# Patient Record
Sex: MALE | Race: WHITE | NOT HISPANIC OR LATINO | Employment: FULL TIME | URBAN - METROPOLITAN AREA
[De-identification: names, ages, dates, MRNs, and addresses within clinical notes are randomized per-mention and may not be internally consistent; named-entity substitution may affect disease eponyms.]

---

## 2023-01-20 PROBLEM — R05.9 COUGH: Status: ACTIVE | Noted: 2023-01-20

## 2023-01-20 PROBLEM — N52.9 ERECTILE DYSFUNCTION: Status: ACTIVE | Noted: 2023-01-20

## 2023-01-20 PROBLEM — E78.00 HYPERCHOLESTEROLEMIA: Status: ACTIVE | Noted: 2023-01-20

## 2023-01-20 PROBLEM — R73.01 ELEVATED FASTING GLUCOSE: Status: ACTIVE | Noted: 2023-01-20

## 2023-01-20 PROBLEM — E78.5 HYPERLIPIDEMIA: Status: ACTIVE | Noted: 2023-01-20

## 2023-01-20 PROBLEM — R68.82 DECREASED LIBIDO: Status: ACTIVE | Noted: 2023-01-20

## 2023-01-20 PROBLEM — E55.9 VITAMIN D DEFICIENCY: Status: ACTIVE | Noted: 2023-01-20

## 2023-01-20 PROBLEM — L23.7 CONTACT DERMATITIS DUE TO POISON IVY: Status: ACTIVE | Noted: 2023-01-20

## 2023-01-20 PROBLEM — J40 BRONCHITIS: Status: ACTIVE | Noted: 2023-01-20

## 2023-01-20 PROBLEM — G47.33 OBSTRUCTIVE SLEEP APNEA: Status: ACTIVE | Noted: 2023-01-20

## 2023-01-20 PROBLEM — R03.0 ELEVATED BLOOD PRESSURE READING WITHOUT DIAGNOSIS OF HYPERTENSION: Status: ACTIVE | Noted: 2023-01-20

## 2023-01-20 PROBLEM — R74.01 ELEVATED ALT MEASUREMENT: Status: ACTIVE | Noted: 2023-01-20

## 2023-01-20 PROBLEM — R73.03 PREDIABETES: Status: ACTIVE | Noted: 2023-01-20

## 2023-01-20 RX ORDER — SILDENAFIL 100 MG/1
100 TABLET, FILM COATED ORAL
COMMUNITY
End: 2023-12-06 | Stop reason: SDUPTHER

## 2023-03-07 ENCOUNTER — APPOINTMENT (OUTPATIENT)
Dept: PRIMARY CARE | Facility: CLINIC | Age: 48
End: 2023-03-07
Payer: COMMERCIAL

## 2023-10-26 ENCOUNTER — ANCILLARY PROCEDURE (OUTPATIENT)
Dept: RADIOLOGY | Facility: CLINIC | Age: 48
End: 2023-10-26
Payer: COMMERCIAL

## 2023-10-26 ENCOUNTER — OFFICE VISIT (OUTPATIENT)
Dept: PRIMARY CARE | Facility: CLINIC | Age: 48
End: 2023-10-26
Payer: COMMERCIAL

## 2023-10-26 VITALS
HEIGHT: 75 IN | DIASTOLIC BLOOD PRESSURE: 71 MMHG | OXYGEN SATURATION: 96 % | HEART RATE: 62 BPM | BODY MASS INDEX: 38.27 KG/M2 | WEIGHT: 307.8 LBS | SYSTOLIC BLOOD PRESSURE: 128 MMHG

## 2023-10-26 DIAGNOSIS — M54.2 NECK PAIN: ICD-10-CM

## 2023-10-26 DIAGNOSIS — Z23 FLU VACCINE NEED: ICD-10-CM

## 2023-10-26 DIAGNOSIS — M54.2 NECK PAIN: Primary | ICD-10-CM

## 2023-10-26 DIAGNOSIS — S06.0X1D CONCUSSION WITH LOSS OF CONSCIOUSNESS OF 30 MINUTES OR LESS, SUBSEQUENT ENCOUNTER: ICD-10-CM
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
## 2023-10-26 PROBLEM — S06.0X1A CONCUSSION WITH LOSS OF CONSCIOUSNESS OF 30 MINUTES OR LESS: Status: ACTIVE | Noted: 2023-10-26
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
## 2023-10-26 PROCEDURE — 1036F TOBACCO NON-USER: CPT | Performed by: INTERNAL MEDICINE

## 2023-10-26 PROCEDURE — 90471 IMMUNIZATION ADMIN: CPT | Performed by: INTERNAL MEDICINE

## 2023-10-26 PROCEDURE — 72052 X-RAY EXAM NECK SPINE 6/>VWS: CPT | Performed by: RADIOLOGY

## 2023-10-26 PROCEDURE — 72052 X-RAY EXAM NECK SPINE 6/>VWS: CPT | Mod: FY

## 2023-10-26 PROCEDURE — 90686 IIV4 VACC NO PRSV 0.5 ML IM: CPT | Performed by: INTERNAL MEDICINE

## 2023-10-26 PROCEDURE — 99213 OFFICE O/P EST LOW 20 MIN: CPT | Performed by: INTERNAL MEDICINE

## 2023-10-26 ASSESSMENT — ENCOUNTER SYMPTOMS
FEVER: 0
HEADACHES: 1
LIGHT-HEADEDNESS: 1
COUGH: 0
WEAKNESS: 0
MYALGIAS: 0
SHORTNESS OF BREATH: 0
ARTHRALGIAS: 0
DIZZINESS: 0
HEMATURIA: 0
SORE THROAT: 0
FATIGUE: 1
DIFFICULTY URINATING: 0
CHILLS: 0
FREQUENCY: 0
PALPITATIONS: 0
VOMITING: 0
NAUSEA: 0
DYSURIA: 0
CONSTIPATION: 0
DIARRHEA: 0

## 2023-10-26 ASSESSMENT — PATIENT HEALTH QUESTIONNAIRE - PHQ9
2. FEELING DOWN, DEPRESSED OR HOPELESS: NOT AT ALL
1. LITTLE INTEREST OR PLEASURE IN DOING THINGS: NOT AT ALL
SUM OF ALL RESPONSES TO PHQ9 QUESTIONS 1 AND 2: 0

## 2023-10-26 ASSESSMENT — PAIN SCALES - GENERAL: PAINLEVEL: 6

## 2023-10-26 NOTE — ASSESSMENT & PLAN NOTE
Patient had a slip and fall on a basketball floor where he actually hit the back of his head and had a concussion and was unconscious for several minutes.  He did was taken to the emergency room where they did a CT of the head EKG however none of this is available has not been placed in the medical records yet from [facility].  Since then he has had a sore neck especially today headache still in the front of his head he has sound light sensitive and a little dizzy on and off.  He clearly has a concussion so we did recommend he avoid anything that would stimulate or suppress the brain send no caffeine, no alcohol no exertion or exercise.  He is to rest and avoid television video games or reading.  I have told him that I want him to rest for the rest of the weekend and not to work today tomorrow or the weekend but if he is feeling better can go back to work on Monday.  He is not to work on exercise which she does vigorously until at least Tuesday or Wednesday of next week.  When he starts to workout again he was told to start slowly and build up but if it anytime his headaches return he is to back off again.  Patient states understanding.  Wife was also present.  Since I do not have any studies from the ER I will check an x-ray of the neck to make sure he has nothing like a fracture but I suspect this is more like whiplash from the fall.  I did recommend massage to the neck IcyHot and hot compresses.

## 2023-10-26 NOTE — PROGRESS NOTES
Subjective   Patient ID: Panchito Kimble is a 48 y.o. male who presents for ER follow up for neck pain.  BN    Patient is here for ER follow-up after a fall loss of consciousness and concussion.  On Tuesday the patient was in the gym walking across the floor when he suddenly slipped fell and became unconscious.  He was unconscious for several minutes at least 2-3 according to witnesses.  When he woke up he was snoring and gasping for air but he was on his back and does have known sleep apnea.  He remembers waking up to the  waking him up on the gym floor everything is kind of foggy as he was taken by ambulance to [facility] where he did have a CT of the head and EKG was told everything was okay and was discharged with the diagnosis of a fall and concussion.  He is here for follow-up since then.  He does note that his neck is very sore especially if he moves it and he saw the headache in the front of his head since Tuesday.  He is slightly light sensitive, sound sensitive and a little dizzy on and off.  His wife drove him here today and he said that he thought he was feeling fine after resting yesterday until the drive today and now he has headache is definitely back.  He denies any nausea or vomiting.          Review of Systems   Constitutional:  Positive for fatigue. Negative for chills and fever.   HENT:  Negative for sore throat.    Eyes:  Negative for visual disturbance.   Respiratory:  Negative for cough and shortness of breath.    Cardiovascular:  Negative for chest pain, palpitations and leg swelling.   Gastrointestinal:  Negative for constipation, diarrhea, nausea and vomiting.   Genitourinary:  Negative for difficulty urinating, dysuria, frequency, hematuria and urgency.   Musculoskeletal:  Negative for arthralgias and myalgias.   Skin:  Negative for rash.   Neurological:  Positive for light-headedness and headaches. Negative for dizziness, syncope and weakness.       Objective  "  Medication Documentation Review Audit       Reviewed by Laurence Long MD (Physician) on 10/26/23 at 1005      Medication Order Taking? Sig Documenting Provider Last Dose Status   sildenafil (Viagra) 100 mg tablet [id number]  Take 1 tablet (100 mg) by mouth. Take 1 tablet orally at least 60 min before intercourse Historical Provider, MD  Active                  No Known Allergies  Physical Exam  Constitutional:       Appearance: Normal appearance.   HENT:      Head: Normocephalic and atraumatic.      Nose: Nose normal.   Eyes:      Extraocular Movements: Extraocular movements intact.      Pupils: Pupils are equal, round, and reactive to light.   Cardiovascular:      Rate and Rhythm: Normal rate and regular rhythm.   Pulmonary:      Breath sounds: Normal breath sounds.   Abdominal:      General: Abdomen is flat. Bowel sounds are normal.      Palpations: Abdomen is soft.   Musculoskeletal:      Right lower leg: No edema.      Left lower leg: No edema.   Neurological:      Mental Status: He is alert.     /71   Pulse 62   Ht 1.892 m (6' 2.5\")   Wt 140 kg (307 lb 12.8 oz)   SpO2 96%   BMI 38.99 kg/m²       Assessment/Plan   Problem List Items Addressed This Visit       Fall    Concussion with loss of consciousness of 30 minutes or less     Patient had a slip and fall on a basketball floor where he actually hit the back of his head and had a concussion and was unconscious for several minutes.  He did was taken to the emergency room where they did a CT of the head EKG however none of this is available has not been placed in the medical records yet from [facility].  Since then he has had a sore neck especially today headache still in the front of his head he has sound light sensitive and a little dizzy on and off.  He clearly has a concussion so we did recommend he avoid anything that would stimulate or suppress the brain send no caffeine, no alcohol no exertion or exercise.  He is to rest and " avoid television video games or reading.  I have told him that I want him to rest for the rest of the weekend and not to work today tomorrow or the weekend but if he is feeling better can go back to work on Monday.  He is not to work on exercise which she does vigorously until at least Tuesday or Wednesday of next week.  When he starts to workout again he was told to start slowly and build up but if it anytime his headaches return he is to back off again.  Patient states understanding.  Wife was also present.  Since I do not have any studies from the ER I will check an x-ray of the neck to make sure he has nothing like a fracture but I suspect this is more like whiplash from the fall.  I did recommend massage to the neck IcyHot and hot compresses.              Other Visit Diagnoses       Neck pain    -  Primary    Flu vaccine need        Relevant Orders    Flu vaccine (IIV4) age 6 months and greater, preservative free (Completed)                   It has been a pleasure seeing you.  Laurence Long MD

## 2023-10-30 ENCOUNTER — TELEPHONE (OUTPATIENT)
Dept: PRIMARY CARE | Facility: CLINIC | Age: 48
End: 2023-10-30
Payer: COMMERCIAL

## 2023-10-30 NOTE — TELEPHONE ENCOUNTER
----- Message from Laurence Long MD sent at 10/30/2023  9:12 AM EDT -----  Please notify patient his x-rays show mild to moderate arthritic changes in the neck but no fractures or trauma from the fall.

## 2023-12-06 DIAGNOSIS — N52.9 ERECTILE DYSFUNCTION, UNSPECIFIED ERECTILE DYSFUNCTION TYPE: Primary | ICD-10-CM

## 2023-12-06 RX ORDER — SILDENAFIL 100 MG/1
100 TABLET, FILM COATED ORAL DAILY PRN
Qty: 10 TABLET | Refills: 11 | Status: SHIPPED | OUTPATIENT
Start: 2023-12-06 | End: 2024-01-08 | Stop reason: SDUPTHER

## 2024-01-08 DIAGNOSIS — N52.9 ERECTILE DYSFUNCTION, UNSPECIFIED ERECTILE DYSFUNCTION TYPE: ICD-10-CM

## 2024-01-08 DIAGNOSIS — Z00.00 ANNUAL PHYSICAL EXAM: Primary | ICD-10-CM

## 2024-01-08 RX ORDER — SILDENAFIL 100 MG/1
100 TABLET, FILM COATED ORAL DAILY PRN
Qty: 10 TABLET | Refills: 0 | Status: SHIPPED | OUTPATIENT
Start: 2024-01-08 | End: 2024-03-08 | Stop reason: SDUPTHER

## 2024-03-07 ENCOUNTER — LAB (OUTPATIENT)
Dept: LAB | Facility: LAB | Age: 49
End: 2024-03-07
Payer: COMMERCIAL

## 2024-03-07 DIAGNOSIS — Z00.00 ANNUAL PHYSICAL EXAM: ICD-10-CM

## 2024-03-07 LAB
25(OH)D3 SERPL-MCNC: 34 NG/ML (ref 30–100)
ALBUMIN SERPL BCP-MCNC: 4.7 G/DL (ref 3.4–5)
ALP SERPL-CCNC: 62 U/L (ref 33–120)
ALT SERPL W P-5'-P-CCNC: 35 U/L (ref 10–52)
ANION GAP SERPL CALC-SCNC: 13 MMOL/L (ref 10–20)
AST SERPL W P-5'-P-CCNC: 21 U/L (ref 9–39)
BILIRUB SERPL-MCNC: 0.7 MG/DL (ref 0–1.2)
BUN SERPL-MCNC: 15 MG/DL (ref 6–23)
CALCIUM SERPL-MCNC: 9.8 MG/DL (ref 8.6–10.6)
CHLORIDE SERPL-SCNC: 100 MMOL/L (ref 98–107)
CHOLEST SERPL-MCNC: 214 MG/DL (ref 0–199)
CHOLESTEROL/HDL RATIO: 5.9
CO2 SERPL-SCNC: 31 MMOL/L (ref 21–32)
CREAT SERPL-MCNC: 1.01 MG/DL (ref 0.5–1.3)
EGFRCR SERPLBLD CKD-EPI 2021: >90 ML/MIN/1.73M*2
ERYTHROCYTE [DISTWIDTH] IN BLOOD BY AUTOMATED COUNT: 12.3 % (ref 11.5–14.5)
GLUCOSE SERPL-MCNC: 99 MG/DL (ref 74–99)
HCT VFR BLD AUTO: 51.1 % (ref 41–52)
HDLC SERPL-MCNC: 36.3 MG/DL
HGB BLD-MCNC: 17.5 G/DL (ref 13.5–17.5)
LDLC SERPL CALC-MCNC: 141 MG/DL
MCH RBC QN AUTO: 30.1 PG (ref 26–34)
MCHC RBC AUTO-ENTMCNC: 34.2 G/DL (ref 32–36)
MCV RBC AUTO: 88 FL (ref 80–100)
NON HDL CHOLESTEROL: 178 MG/DL (ref 0–149)
NRBC BLD-RTO: 0 /100 WBCS (ref 0–0)
PLATELET # BLD AUTO: 268 X10*3/UL (ref 150–450)
POTASSIUM SERPL-SCNC: 4.1 MMOL/L (ref 3.5–5.3)
PROT SERPL-MCNC: 7.3 G/DL (ref 6.4–8.2)
RBC # BLD AUTO: 5.82 X10*6/UL (ref 4.5–5.9)
SODIUM SERPL-SCNC: 140 MMOL/L (ref 136–145)
TRIGL SERPL-MCNC: 185 MG/DL (ref 0–149)
TSH SERPL-ACNC: 1 MIU/L (ref 0.44–3.98)
VLDL: 37 MG/DL (ref 0–40)
WBC # BLD AUTO: 7.1 X10*3/UL (ref 4.4–11.3)

## 2024-03-07 PROCEDURE — 84443 ASSAY THYROID STIM HORMONE: CPT

## 2024-03-07 PROCEDURE — 36415 COLL VENOUS BLD VENIPUNCTURE: CPT

## 2024-03-07 PROCEDURE — 82306 VITAMIN D 25 HYDROXY: CPT

## 2024-03-07 PROCEDURE — 80053 COMPREHEN METABOLIC PANEL: CPT

## 2024-03-07 PROCEDURE — 85027 COMPLETE CBC AUTOMATED: CPT

## 2024-03-07 PROCEDURE — 80061 LIPID PANEL: CPT

## 2024-03-08 DIAGNOSIS — N52.9 ERECTILE DYSFUNCTION, UNSPECIFIED ERECTILE DYSFUNCTION TYPE: ICD-10-CM

## 2024-03-08 RX ORDER — SILDENAFIL 100 MG/1
100 TABLET, FILM COATED ORAL DAILY PRN
Qty: 10 TABLET | Refills: 11 | Status: SHIPPED | OUTPATIENT
Start: 2024-03-08

## 2024-07-11 DIAGNOSIS — N52.9 ERECTILE DYSFUNCTION, UNSPECIFIED ERECTILE DYSFUNCTION TYPE: ICD-10-CM

## 2024-07-11 NOTE — TELEPHONE ENCOUNTER
Patient has a physical 7/17    He wants to know if he can/should do b/w before then?    Also, there is a rx you wrote him awhile ago (not viagra) something with a C, he cannot remember,but he would like a refill if possible

## 2024-07-12 RX ORDER — SILDENAFIL 100 MG/1
100 TABLET, FILM COATED ORAL DAILY PRN
Qty: 10 TABLET | Refills: 0 | Status: SHIPPED | OUTPATIENT
Start: 2024-07-12 | End: 2024-07-17 | Stop reason: SDUPTHER

## 2024-07-12 NOTE — TELEPHONE ENCOUNTER
Patient to know if he need blood work before his next appointment    Med refill    sildenafil (Viagra) 100 mg tablet  100 mg, Daily PRN           Summary: Take 1 tablet (100 mg) by mouth   once daily as needed for erectile dysfunction.   Take 1 tablet orally at least 60 min before   intercourse,        [place]

## 2024-07-12 NOTE — TELEPHONE ENCOUNTER
He does have a physical scheduled for Wednesday the 17th    Still asking if he needs b/w before    Also says please call in whatever it was before  (Viagra)  He would like to  today    Sildenafil 100 mg tablet   Take 1 tablet (100mg) by mouth once daily as needed for erectile dysfunction. Take 1 tablet orally at least 60 min before intercourse    [facility] [phone]

## 2024-07-17 ENCOUNTER — APPOINTMENT (OUTPATIENT)
Dept: PRIMARY CARE | Facility: CLINIC | Age: 49
End: 2024-07-17
Payer: COMMERCIAL

## 2024-07-17 VITALS
SYSTOLIC BLOOD PRESSURE: 126 MMHG | DIASTOLIC BLOOD PRESSURE: 76 MMHG | HEART RATE: 61 BPM | OXYGEN SATURATION: 95 % | HEIGHT: 74 IN | BODY MASS INDEX: 39.35 KG/M2 | WEIGHT: 306.6 LBS

## 2024-07-17 DIAGNOSIS — Z12.11 COLON CANCER SCREENING: Primary | ICD-10-CM

## 2024-07-17 DIAGNOSIS — E78.00 HYPERCHOLESTEROLEMIA: ICD-10-CM

## 2024-07-17 DIAGNOSIS — Z00.00 ANNUAL PHYSICAL EXAM: ICD-10-CM

## 2024-07-17 DIAGNOSIS — N52.9 ERECTILE DYSFUNCTION, UNSPECIFIED ERECTILE DYSFUNCTION TYPE: ICD-10-CM

## 2024-07-17 DIAGNOSIS — L23.7 CONTACT DERMATITIS DUE TO POISON IVY: ICD-10-CM

## 2024-07-17 DIAGNOSIS — R03.0 ELEVATED BLOOD PRESSURE READING WITHOUT DIAGNOSIS OF HYPERTENSION: ICD-10-CM

## 2024-07-17 PROBLEM — S06.0X1A CONCUSSION WITH LOSS OF CONSCIOUSNESS OF 30 MINUTES OR LESS: Status: RESOLVED | Noted: 2023-10-26 | Resolved: 2024-07-17

## 2024-07-17 PROBLEM — R05.9 COUGH: Status: RESOLVED | Noted: 2023-01-20 | Resolved: 2024-07-17

## 2024-07-17 PROBLEM — J40 BRONCHITIS: Status: RESOLVED | Noted: 2023-01-20 | Resolved: 2024-07-17

## 2024-07-17 PROCEDURE — 1036F TOBACCO NON-USER: CPT | Performed by: INTERNAL MEDICINE

## 2024-07-17 PROCEDURE — 99396 PREV VISIT EST AGE 40-64: CPT | Performed by: INTERNAL MEDICINE

## 2024-07-17 PROCEDURE — 3008F BODY MASS INDEX DOCD: CPT | Performed by: INTERNAL MEDICINE

## 2024-07-17 RX ORDER — SILDENAFIL 100 MG/1
100 TABLET, FILM COATED ORAL DAILY PRN
Qty: 30 TABLET | Refills: 3 | Status: SHIPPED | OUTPATIENT
Start: 2024-07-17

## 2024-07-17 ASSESSMENT — ENCOUNTER SYMPTOMS
DIZZINESS: 0
TROUBLE SWALLOWING: 0
COUGH: 0
CONSTIPATION: 0
NAUSEA: 0
FEVER: 0
DIARRHEA: 0
PALPITATIONS: 0
ARTHRALGIAS: 0
BACK PAIN: 1
SHORTNESS OF BREATH: 0
VOMITING: 0
FATIGUE: 0
DYSURIA: 0
FREQUENCY: 0
SORE THROAT: 0
ABDOMINAL PAIN: 0
LIGHT-HEADEDNESS: 0

## 2024-07-17 ASSESSMENT — PATIENT HEALTH QUESTIONNAIRE - PHQ9
SUM OF ALL RESPONSES TO PHQ9 QUESTIONS 1 AND 2: 0
1. LITTLE INTEREST OR PLEASURE IN DOING THINGS: NOT AT ALL
2. FEELING DOWN, DEPRESSED OR HOPELESS: NOT AT ALL

## 2024-07-17 ASSESSMENT — PAIN SCALES - GENERAL: PAINLEVEL: 8

## 2024-07-17 NOTE — PROGRESS NOTES
Subjective   Patient ID: Panchito Kimble is a 49 y.o. male who presents for an annual physical visit.    Patient is here for annual physical exam.  He has a lipoma in the right lower abdominal wall he would like me to look at also he has noticed some increasing problems with lower back pain.  He cannot play to give the pickleball in a row without sitting when out and letting it stretch a little between.  Patient is very strong muscular and I suspect not as limber and recommended some stretching exercises.  I also recommended he consider going to the stretch lab for his lower back as the pain is located across the lumbar spine just above the pelvis bilaterally.  He occasionally gets pain into the left gluteal area and may begin experiencing some sciatica as well but denies any pain into the leg.        Review of Systems   Constitutional:  Negative for fatigue and fever.   HENT:  Negative for sore throat and trouble swallowing.    Eyes:  Negative for visual disturbance.   Respiratory:  Negative for cough and shortness of breath.    Cardiovascular:  Negative for chest pain, palpitations and leg swelling.   Gastrointestinal:  Negative for abdominal pain, constipation, diarrhea, nausea and vomiting.   Genitourinary:  Negative for dysuria and frequency.        Patient experiences some erectile dysfunction both getting and maintaining erection however tadalafil is working well for him and he does request a refill   Musculoskeletal:  Positive for back pain. Negative for arthralgias.   Skin:  Negative for rash.   Neurological:  Negative for dizziness and light-headedness.       Objective   Medication Documentation Review Audit       Reviewed by Laurence Long MD (Physician) on 07/17/24 at 1111      Medication Order Taking? Sig Documenting Provider Last Dose Status     Discontinued 07/12/24 1211   sildenafil (Viagra) 100 mg tablet [id number]  Take 1 tablet (100 mg) by mouth once daily as needed for erectile dysfunction. Take 1  "tablet orally at least 60 min before intercourse Laurence Long MD  Active                  No Known Allergies    /76   Pulse 61   Ht 1.88 m (6' 2\")   Wt 139 kg (306 lb 9.6 oz)   SpO2 95%   BMI 39.37 kg/m²     Physical Exam  Constitutional:       Appearance: Normal appearance.   HENT:      Head: Normocephalic and atraumatic.      Nose: Nose normal.   Eyes:      Extraocular Movements: Extraocular movements intact.      Pupils: Pupils are equal, round, and reactive to light.   Cardiovascular:      Rate and Rhythm: Normal rate and regular rhythm.   Pulmonary:      Breath sounds: Normal breath sounds.   Abdominal:      General: Abdomen is flat. Bowel sounds are normal.      Palpations: Abdomen is soft.      Comments: In the right lower quadrant of the abdomen just below the umbilical line patient has approximately 2 x 2 centimeter subcutaneous mass which is soft rounded and it presumably a lipoma.  Patient notes it has been present for at least 2 years and has not grown or changed to his knowledge.   Genitourinary:     Penis: Normal.       Testes: Normal.      Comments: External genitalia grossly normal.  Penis is grossly normal.  Testicles have no dominant masses.  There were no inguinal hernias bilaterally.  Musculoskeletal:      Right lower leg: No edema.      Left lower leg: No edema.   Neurological:      Mental Status: He is alert.           Assessment/Plan   Problem List Items Addressed This Visit       Contact dermatitis due to poison ivy     Patient has 2 small patches on his right forearm each 1 only 1 cm wide at the width that is the biggest and between 2 and 3 cm long.  Both areas are already healing over and have no open vesicles or sores.  I recommended he use IV dry over-the-counter if that is not sufficient call us and we will consider a superhigh potency steroid gel.  If he continues to worsen we go issues oral steroids if necessary         Elevated blood pressure reading without diagnosis of " hypertension    Erectile dysfunction     Patient uses tadalafil successfully and says that it is working well for him.  He was given a refill for the year         Relevant Medications    sildenafil (Viagra) 100 mg tablet    Hypercholesterolemia     Patient has mildly elevated cholesterol he will continue to work on diet and exercise.  He has been having a hard time losing weight so we talked about how to decrease both carbohydrates and fats by reducing cheese nuts high-fat foods like pepperoni salami and work on low-carb diets as well.  If he continues to have issues he is to call me back.         Annual physical exam     Annual physical exam completed today and to date including inguinal exam and testicular exam.  He has no symptoms so we did not do a prostate exam    Annual blood work was finished in March.  Patient is overdue for colonoscopy and we will help arrange 1 now.    Patient has some low back pain which is I believe muscular in origin and I recommended he consider going to stretch lab or learning how to stretch his back properly.  He was also or offered physical therapy which she declines now but if symptoms persist he will call back and we can give him a referral          Other Visit Diagnoses       Colon cancer screening    -  Primary    Relevant Orders    Colonoscopy Screening; High Risk Patient                   It has been a pleasure seeing you.  Laurence Long MD

## 2024-07-17 NOTE — ASSESSMENT & PLAN NOTE
Annual physical exam completed today and to date including inguinal exam and testicular exam.  He has no symptoms so we did not do a prostate exam    Annual blood work was finished in March.  Patient is overdue for colonoscopy and we will help arrange 1 now.    Patient has some low back pain which is I believe muscular in origin and I recommended he consider going to stretch lab or learning how to stretch his back properly.  He was also or offered physical therapy which she declines now but if symptoms persist he will call back and we can give him a referral

## 2024-07-17 NOTE — ASSESSMENT & PLAN NOTE
Patient has 2 small patches on his right forearm each 1 only 1 cm wide at the width that is the biggest and between 2 and 3 cm long.  Both areas are already healing over and have no open vesicles or sores.  I recommended he use IV dry over-the-counter if that is not sufficient call us and we will consider a superhigh potency steroid gel.  If he continues to worsen we go issues oral steroids if necessary

## 2024-07-17 NOTE — ASSESSMENT & PLAN NOTE
Patient has mildly elevated cholesterol he will continue to work on diet and exercise.  He has been having a hard time losing weight so we talked about how to decrease both carbohydrates and fats by reducing cheese nuts high-fat foods like pepperoni salami and work on low-carb diets as well.  If he continues to have issues he is to call me back.

## 2024-07-17 NOTE — PATIENT INSTRUCTIONS
Set up colonoscopy    Try Lucila Dry for poison lucila     Follow up Dr Long in 1 year for annual physical

## 2024-07-17 NOTE — ASSESSMENT & PLAN NOTE
Patient uses tadalafil successfully and says that it is working well for him.  He was given a refill for the year

## 2024-07-18 ENCOUNTER — TELEPHONE (OUTPATIENT)
Dept: PRIMARY CARE | Facility: CLINIC | Age: 49
End: 2024-07-18
Payer: COMMERCIAL

## 2024-07-18 NOTE — TELEPHONE ENCOUNTER
Patient left message on phone- decided he does want steroid cream for his poison ivy. [facility]. Can this be sent in for him.

## 2024-07-19 DIAGNOSIS — L24.9 IRRITANT CONTACT DERMATITIS, UNSPECIFIED TRIGGER: Primary | ICD-10-CM

## 2024-07-19 RX ORDER — BETAMETHASONE DIPROPIONATE 0.5 MG/G
GEL TOPICAL 2 TIMES DAILY
Qty: 15 G | Refills: 0 | Status: SHIPPED | OUTPATIENT
Start: 2024-07-19

## 2024-10-24 DIAGNOSIS — Z12.11 COLON CANCER SCREENING: ICD-10-CM

## 2024-10-24 RX ORDER — POLYETHYLENE GLYCOL 3350, SODIUM CHLORIDE, SODIUM BICARBONATE, POTASSIUM CHLORIDE 420; 11.2; 5.72; 1.48 G/4L; G/4L; G/4L; G/4L
4000 POWDER, FOR SOLUTION ORAL ONCE
Qty: 4000 ML | Refills: 0 | Status: SHIPPED | OUTPATIENT
Start: 2024-11-04 | End: 2024-11-04

## 2024-10-29 ENCOUNTER — ANESTHESIA EVENT (OUTPATIENT)
Dept: GASTROENTEROLOGY | Facility: EXTERNAL LOCATION | Age: 49
End: 2024-10-29

## 2024-10-30 ENCOUNTER — APPOINTMENT (OUTPATIENT)
Dept: OPERATING ROOM | Facility: CLINIC | Age: 49
End: 2024-10-30
Payer: COMMERCIAL

## 2024-11-11 ENCOUNTER — ANESTHESIA (OUTPATIENT)
Dept: GASTROENTEROLOGY | Facility: EXTERNAL LOCATION | Age: 49
End: 2024-11-11

## 2024-11-11 ENCOUNTER — APPOINTMENT (OUTPATIENT)
Dept: GASTROENTEROLOGY | Facility: EXTERNAL LOCATION | Age: 49
End: 2024-11-11
Payer: COMMERCIAL

## 2024-11-11 VITALS
HEART RATE: 61 BPM | DIASTOLIC BLOOD PRESSURE: 45 MMHG | OXYGEN SATURATION: 96 % | TEMPERATURE: 98.4 F | BODY MASS INDEX: 37.86 KG/M2 | HEIGHT: 74 IN | RESPIRATION RATE: 18 BRPM | SYSTOLIC BLOOD PRESSURE: 123 MMHG | WEIGHT: 295 LBS

## 2024-11-11 DIAGNOSIS — Z12.11 COLON CANCER SCREENING: ICD-10-CM

## 2024-11-11 PROCEDURE — 88305 TISSUE EXAM BY PATHOLOGIST: CPT | Mod: TC,ELYLAB | Performed by: INTERNAL MEDICINE

## 2024-11-11 PROCEDURE — 45380 COLONOSCOPY AND BIOPSY: CPT | Performed by: INTERNAL MEDICINE

## 2024-11-11 RX ORDER — ONDANSETRON HYDROCHLORIDE 2 MG/ML
4 INJECTION, SOLUTION INTRAVENOUS ONCE AS NEEDED
Status: DISCONTINUED | OUTPATIENT
Start: 2024-11-11 | End: 2024-11-12 | Stop reason: HOSPADM

## 2024-11-11 RX ORDER — LIDOCAINE HYDROCHLORIDE 20 MG/ML
INJECTION, SOLUTION INFILTRATION; PERINEURAL AS NEEDED
Status: DISCONTINUED | OUTPATIENT
Start: 2024-11-11 | End: 2024-11-11

## 2024-11-11 RX ORDER — PROPOFOL 10 MG/ML
INJECTION, EMULSION INTRAVENOUS AS NEEDED
Status: DISCONTINUED | OUTPATIENT
Start: 2024-11-11 | End: 2024-11-11

## 2024-11-11 SDOH — HEALTH STABILITY: MENTAL HEALTH: CURRENT SMOKER: 0

## 2024-11-11 ASSESSMENT — PAIN SCALES - GENERAL
PAIN_LEVEL: 0
PAINLEVEL_OUTOF10: 0 - NO PAIN

## 2024-11-11 ASSESSMENT — COLUMBIA-SUICIDE SEVERITY RATING SCALE - C-SSRS
2. HAVE YOU ACTUALLY HAD ANY THOUGHTS OF KILLING YOURSELF?: NO
6. HAVE YOU EVER DONE ANYTHING, STARTED TO DO ANYTHING, OR PREPARED TO DO ANYTHING TO END YOUR LIFE?: NO
1. IN THE PAST MONTH, HAVE YOU WISHED YOU WERE DEAD OR WISHED YOU COULD GO TO SLEEP AND NOT WAKE UP?: NO

## 2024-11-11 ASSESSMENT — PAIN - FUNCTIONAL ASSESSMENT
PAIN_FUNCTIONAL_ASSESSMENT: 0-10
PAIN_FUNCTIONAL_ASSESSMENT: 0-10

## 2024-11-11 NOTE — DISCHARGE INSTRUCTIONS

## 2024-11-11 NOTE — ANESTHESIA PREPROCEDURE EVALUATION
Patient: Panchito Kimble    Procedure Information       Date/Time: 11/11/24 1100    Scheduled providers: Dean Wong MD; María Welsh RN    Procedure: COLONOSCOPY    Location: [facility]            Relevant Problems   Anesthesia (within normal limits)      Cardiac   (+) Hypercholesterolemia   (+) Hyperlipidemia      Pulmonary   (+) Obstructive sleep apnea      Neuro (within normal limits)      GI (within normal limits)      /Renal (within normal limits)      Liver (within normal limits)      Endocrine (within normal limits)      Hematology (within normal limits)      Musculoskeletal (within normal limits)      HEENT (within normal limits)      ID (within normal limits)      Skin (within normal limits)      GYN (within normal limits)       Clinical information reviewed:   Tobacco  Allergies  Meds   Med Hx  Surg Hx   Fam Hx  Soc Hx        NPO Detail:  No data recorded     Physical Exam    Airway  Mallampati: II  TM distance: >3 FB  Neck ROM: full     Cardiovascular   Rhythm: regular  Rate: normal     Dental    Pulmonary   Breath sounds clear to auscultation     Abdominal   Abdomen: soft  Bowel sounds: normal           Anesthesia Plan    History of general anesthesia?: yes  History of complications of general anesthesia?: no    ASA 2     MAC     The patient is not a current smoker.  Patient was not previously instructed to abstain from smoking on day of procedure.  Education provided regarding risk of obstructive sleep apnea.  intravenous induction   Anesthetic plan and risks discussed with patient.    Plan discussed with CRNA.

## 2024-11-11 NOTE — H&P
Outpatient Hospital Procedure H&P    Patient Profile-Procedures  Initial Info  Patient Demographics  Name Panchito Kimble  Date of Birth 1975  MRN [id number]  Address   [address] [address] [id number] [facility] [id number]    Primary Phone Number [phone]  Secondary Phone Number    Laurence Teran    Procedure(s):  Colonoscopy  Primary contact name and number   No emergency contact information on file.    General Health  Weight   Vitals:    11/11/24 1056   Weight: 134 kg (295 lb)     BMI Body mass index is 37.88 kg/m².    Allergies  No Known Allergies    Past Medical History   Past Medical History:   Diagnosis Date    Concussion with loss of consciousness of 30 minutes or less 10/26/2023       Provider assessment  Diagnosis: Screening    Medication Reviewed - yes  Prior to Admission medications    Medication Sig Start Date End Date Taking? Authorizing Provider   polyethylene glycol-electrolytes (Nulytely Lemon-Lime) 420 gram solution Take 4,000 mL by mouth 1 time for 1 dose. Do not fill before November 4, 2024. 11/4/24 11/11/24 Yes Dean Wong MD   betamethasone, augmented, (Diprolene) 0.05 % gel Apply topically 2 times a day. 7/19/24   Laurence Long MD   sildenafil (Viagra) 100 mg tablet Take 1 tablet (100 mg) by mouth once daily as needed for erectile dysfunction. Take 1 tablet orally at least 60 min before intercourse 7/17/24   Laurence Long MD       Physical Exam  Vitals:    11/11/24 1056   BP: 150/85   Pulse: 70   Resp: 12   Temp: 36.7 °C (98.1 °F)   SpO2: 94%        General: A&Ox3, NAD.  CV: RRR. No murmur.  Resp: CTA bilaterally. No wheezing, rhonchi or rales.   Extrem: No edema.       Oropharyngeal Classification III (soft and hard palate and base of uvula visible)  ASA PS Classification 3  Sedation Plan Deep  Procedure Plan - pre-procedural (re)assesment completed by physician:  discharge/transfer patient when discharge criteria met    Dean Wong  MD  11/11/2024 11:12 AM

## 2024-11-11 NOTE — ANESTHESIA POSTPROCEDURE EVALUATION
Patient: Panchito Kimble    Procedure Summary       Date: 11/11/24 Room / Location: [facility]    Anesthesia Start: 1116 Anesthesia Stop: 1143    Procedure: COLONOSCOPY Diagnosis: Colon cancer screening    Scheduled Providers: Dean Wong MD; María Welsh RN Responsible Provider: LIANA Barker    Anesthesia Type: MAC ASA Status: 2            Anesthesia Type: MAC    Vitals Value Taken Time   /61 11/11/24 1141   Temp 36.9 °C (98.4 °F) 11/11/24 1141   Pulse 62 11/11/24 1141   Resp 15 11/11/24 1141   SpO2 96 % 11/11/24 1141       Anesthesia Post Evaluation    Patient location during evaluation: bedside  Patient participation: complete - patient participated  Level of consciousness: awake  Pain score: 0  Pain management: adequate  Airway patency: patent  Cardiovascular status: acceptable  Respiratory status: acceptable  Hydration status: acceptable  Postoperative Nausea and Vomiting: none        No notable events documented.

## 2024-11-20 LAB
LABORATORY COMMENT REPORT: NORMAL
PATH REPORT.FINAL DX SPEC: NORMAL
PATH REPORT.GROSS SPEC: NORMAL
PATH REPORT.TOTAL CANCER: NORMAL

## 2025-05-01 ENCOUNTER — TELEPHONE (OUTPATIENT)
Dept: PRIMARY CARE | Facility: CLINIC | Age: 50
End: 2025-05-01
Payer: COMMERCIAL

## 2025-05-01 NOTE — TELEPHONE ENCOUNTER
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   He wants to know if Dr Long can do this or does he go to a dermatologist?  He thinks his old PCP did this years ago but he cannot remember for sure.   Should I make him an appointment or does he need a derm referral?    Panchito  [phone]

## 2025-05-06 ENCOUNTER — APPOINTMENT (OUTPATIENT)
Dept: PRIMARY CARE | Facility: CLINIC | Age: 50
End: 2025-05-06
Payer: COMMERCIAL

## 2025-05-07 ENCOUNTER — APPOINTMENT (OUTPATIENT)
Dept: PRIMARY CARE | Facility: CLINIC | Age: 50
End: 2025-05-07
Payer: COMMERCIAL